# Patient Record
Sex: FEMALE | Race: BLACK OR AFRICAN AMERICAN
[De-identification: names, ages, dates, MRNs, and addresses within clinical notes are randomized per-mention and may not be internally consistent; named-entity substitution may affect disease eponyms.]

---

## 2019-09-21 ENCOUNTER — HOSPITAL ENCOUNTER (EMERGENCY)
Dept: HOSPITAL 25 - UCEAST | Age: 29
Discharge: HOME | End: 2019-09-21
Payer: COMMERCIAL

## 2019-09-21 VITALS — DIASTOLIC BLOOD PRESSURE: 87 MMHG | SYSTOLIC BLOOD PRESSURE: 125 MMHG

## 2019-09-21 DIAGNOSIS — N39.0: Primary | ICD-10-CM

## 2019-09-21 PROCEDURE — G0463 HOSPITAL OUTPT CLINIC VISIT: HCPCS

## 2019-09-21 PROCEDURE — 84702 CHORIONIC GONADOTROPIN TEST: CPT

## 2019-09-21 PROCEDURE — 87186 SC STD MICRODIL/AGAR DIL: CPT

## 2019-09-21 PROCEDURE — 87086 URINE CULTURE/COLONY COUNT: CPT

## 2019-09-21 PROCEDURE — 99212 OFFICE O/P EST SF 10 MIN: CPT

## 2019-09-21 PROCEDURE — 81003 URINALYSIS AUTO W/O SCOPE: CPT

## 2019-09-21 PROCEDURE — 87077 CULTURE AEROBIC IDENTIFY: CPT

## 2019-09-21 NOTE — UC
Complaint Female HPI





- HPI Summary


HPI Summary: 





29-year-old female who has had burning on urination and frequency for the past 

3 days.  She denies any fever or chills.  No nausea vomiting or diarrhea.  She 

is sexually active and not on birth control.  Denies any abnormal vaginal 

discharge.





- History Of Current Complaint


Chief Complaint: UCGU


Stated Complaint: URINARY COMPLAINT


Time Seen by Provider: 09/21/19 08:38


Hx Obtained From: Patient


Hx Last Menstrual Period: 9/9/19


Pregnant?: No


Onset/Duration: Gradual Onset


Timing: Intermittent


Severity Initially: Mild


Severity Currently: Mild


Pain Intensity: 4


Character: Burning


Aggravating Factor(s): Urination


Alleviating Factor(s): Nothing


Associated Signs And Symptoms: Positive: Negative.  Negative: Fever, Back Pain, 

Vaginal Bleeding/Discharge, Vaginal Discharge, Genital Swelling, Genital 

Blisters





- Allergies/Home Medications


Allergies/Adverse Reactions: 


 Allergies











Allergy/AdvReac Type Severity Reaction Status Date / Time


 


No Known Allergies Allergy   Verified 09/21/19 08:49














PMH/Surg Hx/FS Hx/Imm Hx


Previously Healthy: Yes





- Surgical History


Surgical History: None





- Family History


Known Family History: Positive: Non-Contributory





- Social History


Alcohol Use: Occasionally


Substance Use Type: None


Smoking Status (MU): Never Smoked Tobacco





Review of Systems


All Other Systems Reviewed And Are Negative: Yes


Genitourinary: Positive: Dysuria, Frequency, Other - Patient states she has 

pelvic pain which is not anything new and she is following up with her OB/GYN 

regarding this..  Negative: Vaginal/Penile Burning, Vaginal/Penile Itching, 

Vaginal/Penile Discharge, Vaginal/Penile Pain, Vaginal/Penile Tenderness


Is Patient Immunocompromised?: No





Physical Exam


Triage Information Reviewed: Yes


Appearance: Well-Appearing, No Pain Distress, Well-Nourished


Vital Signs: 


 Initial Vital Signs











Temp  97.8 F   09/21/19 08:42


 


Pulse  56   09/21/19 08:42


 


Resp  12   09/21/19 08:42


 


BP  125/87   09/21/19 08:42


 


Pulse Ox  100   09/21/19 08:42











Vital Signs Reviewed: Yes


Eyes: Positive: Conjunctiva Clear


ENT: Positive: Pharynx normal, TMs normal, Uvula midline


Neck: Positive: Supple, Nontender, No Lymphadenopathy


Respiratory: Positive: Lungs clear, Normal breath sounds, No respiratory 

distress, No accessory muscle use


Cardiovascular: Positive: RRR, No Murmur, Pulses Normal, Brisk Capillary Refill


Abdomen Description: Positive: No Organomegaly, Soft, Other: - Tenderness on 

palpation in the suprapubic area which patient states is her normal pelvic 

tenderness for which she is seeking treatment and is not a concern today..  

Negative: CVA Tenderness (R), CVA Tenderness (L), Distended, Guarding


Bowel Sounds: Positive: Present


Musculoskeletal Exam: Normal


Neurological Exam: Normal


Psychological Exam: Normal


Skin Exam: Normal





 Complaint Female Dx





- Course


Course Of Treatment: 





Urinalysis: Positive for urinary tract infection.


Pregnancy test: Negative





I'm going to treat the patient with Bactrim DS one tab by mouth twice a day 5 

days.  She refused Pyridium and stated that she was not having any spasms.  She 

is to increase fluids and follow-up in the ER if any fever, chills, back pain, 

vomiting or worsening symptoms.





- Differential Dx/Diagnosis


Provider Diagnosis: 


 UTI (urinary tract infection)








Discharge ED





- Sign-Out/Discharge


Documenting (check all that apply): Patient Departure


All imaging exams completed and their final reports reviewed: No Studies





- Discharge Plan


Condition: Fair


Disposition: HOME


Prescriptions: 


Sulfamethox/Trimethoprim DS* [Bactrim /160 TAB*] 1 tab PO BID 5 Days #10 

tab


Patient Education Materials:  Urinary Tract Infection in Women (DC)


Referrals: 


Madie Buchanan NP, CNM [Primary Care Provider] - 


Additional Instructions: 


Increase fluids.  Take the Bactrim with food.  Go to the emergency room if you 

develop any fever, chills, back pain and vomiting unable keep the medication 

down.  Follow-up with your primary care provider if no improvement in 3 or 4 

days.





- Billing Disposition and Condition


Condition: FAIR


Disposition: Home

## 2019-09-21 NOTE — XMS REPORT
Continuity of Care Document (CCD)

 Created on:2019



Patient:Kris Frey

Sex:Female

:1990

External Reference #:[id number]





Demographics







 Address  [address] [address]

 

 Mobile Phone  [phone]

 

 Email Address  [email or web address]

 

 Preferred Language  en

 

 Marital Status  Not  or 

 

 Christian Affiliation  Unknown

 

 Race  Black / 

 

 Ethnic Group  Not  or 









Author







 Name  MadieVIKY Cota-Cde (transmitted by agent of provider Amanda Robertson)

 

 Address  [address], [address]



   [place] [address]









Care Team Providers







 Name  Role  Phone

 

 Raji Gonzalez III, MD - Internal  Care Team Information   [phone]
[phone]



 Medicine    

 

 Cryer, Jonathan, MD - Otolaryngology  Care Team Information   [phone]
[phone]









Problems







 Active Problems  Provider  Date

 

 Neuralgia  Nallely Marker, RPA-C  Onset: 2018







Social History







 Type  Date  Description  Comments

 

 Birth Sex    Unknown  

 

 Tobacco Use  Start: Unknown  Never Smoked Cigarettes  

 

 Smoking Status  Reviewed: 19  Never Smoked Cigarettes  

 

 ETOH Use    Denies alcohol use  

 

 Tobacco Use  Start: Unknown  Patient has never  



     smoked  

 

 Recreational Drug Use    Denies Drug Use  

 

 Exercise Type/Frequency  2017  Exercises regularly  Used to runs 5x



       /week On hold now



       b/o lack of time







Allergies, Adverse Reactions, Alerts







 Description

 

 No Known Drug Allergies







Medications







 Active Medications  SIG  Qnty  Indications  Ordering Provider  Date

 

 Tylenol  2 tablets every 4      Unknown  



      325mg Capsules  hours as needed        



   for pain        







Immunizations







 CPT Code  Status  Date  Vaccine  Lot #

 

 [id number]  Given  2017  Meningococcal Immunization  [id number]

 

 32256  Given  2017  Varicella (Chicken Pox) Immunization  [id number]

 

 72819  Given  2017  Tdap - Tetanus/Diptheria/Acellular Pertussis  [id number]







Vital Signs







 Date  Vital  Result  Comment

 

 2019  3:26pm  Height  65.5 inches  5'5.50"









 Weight  168.00 lb  

 

 Heart Rate  74 /min  

 

 BP Systolic  123 mmHg  

 

 BP Diastolic  84 mmHg  

 

 O2 % BldC Oximetry  99 %  

 

 BMI (Body Mass Index)  27.5 kg/m2  

 

 Last Menstrual Period  1250085  









 2019  4:35pm  Height  65.5 inches  5'5.50"









 Weight  168.00 lb  

 

 Heart Rate  90 /min  

 

 BP Systolic Sitting  110 mmHg  

 

 BP Diastolic Sitting  70 mmHg  

 

 O2 % BldC Oximetry  99 %  

 

 BMI (Body Mass Index)  27.5 kg/m2  







Results







 Description

 

 No Information Available







Procedures







 Description

 

 No Information Available







Medical Devices







 Description

 

 No Information Available







Encounters







 Description

 

 No Information Available







Assessments







 Date  Code  Description  Provider

 

 2019  R10.2  Pelvic and perineal pain  Madhu Washburn







Plan of Treatment

Future Appointment(s):2019 11:40 am - VI[facility] Esparza at [facility] VI[facility] Washburn-CdeR10.2 Pelvic and perineal 
pain



Functional Status







 Description

 

 No Information Available







Mental Status







 Description

 

 No Information Available







Referrals







 Description

 

 No Information Available